# Patient Record
Sex: MALE | Race: WHITE | NOT HISPANIC OR LATINO | Employment: FULL TIME | URBAN - METROPOLITAN AREA
[De-identification: names, ages, dates, MRNs, and addresses within clinical notes are randomized per-mention and may not be internally consistent; named-entity substitution may affect disease eponyms.]

---

## 2018-04-14 LAB
ABSOL LYMPHOCYTES (HISTORICAL): 0.6 K/UL (ref 0.5–4)
ALBUMIN SERPL BCP-MCNC: 3.9 G/DL (ref 3–5.2)
ALP SERPL-CCNC: 46 U/L (ref 43–122)
ALT SERPL W P-5'-P-CCNC: 31 U/L (ref 9–52)
ANION GAP SERPL CALCULATED.3IONS-SCNC: 11 MMOL/L (ref 5–14)
AST SERPL W P-5'-P-CCNC: 22 U/L (ref 17–59)
BASOPHILS # BLD AUTO: 0 K/UL (ref 0–0.1)
BASOPHILS # BLD AUTO: 1 % (ref 0–1)
BILIRUB SERPL-MCNC: 0.5 MG/DL
BUN SERPL-MCNC: 30 MG/DL (ref 5–25)
CALCIUM SERPL-MCNC: 9 MG/DL (ref 8.4–10.2)
CHLORIDE SERPL-SCNC: 111 MEQ/L (ref 97–108)
CHOLEST SERPL-MCNC: 118 MG/DL
CHOLEST/HDLC SERPL: 2.5 {RATIO}
CO2 SERPL-SCNC: 19 MMOL/L (ref 22–30)
CREATINE, SERUM (HISTORICAL): 1.13 MG/DL (ref 0.7–1.5)
DEPRECATED RDW RBC AUTO: 13.6 %
EGFR (HISTORICAL): >60 ML/MIN/1.73 M2
EOSINOPHIL # BLD AUTO: 0.2 K/UL (ref 0–0.4)
EOSINOPHIL NFR BLD AUTO: 3 % (ref 0–6)
GLUCOSE FASTING (HISTORICAL): 107 MG/DL (ref 70–99)
HCT VFR BLD AUTO: 31 % (ref 41–53)
HDLC SERPL-MCNC: 47 MG/DL
HGB BLD-MCNC: 10.2 G/DL (ref 13.5–17.5)
LDL/HDL RATIO (HISTORICAL): 1
LDLC SERPL CALC-MCNC: 47 MG/DL
LYMPHOCYTES NFR BLD AUTO: 11 % (ref 25–45)
MCH RBC QN AUTO: 31.5 PG (ref 26–34)
MCHC RBC AUTO-ENTMCNC: 32.9 % (ref 31–36)
MCV RBC AUTO: 96 FL (ref 80–100)
MONOCYTES # BLD AUTO: 0.6 K/UL (ref 0.2–0.9)
MONOCYTES NFR BLD AUTO: 10 % (ref 1–10)
NEUTROPHILS ABS COUNT (HISTORICAL): 4.3 K/UL (ref 1.8–7.8)
NEUTS SEG NFR BLD AUTO: 75 % (ref 45–65)
PLATELET # BLD AUTO: 157 K/MCL (ref 150–450)
POTASSIUM SERPL-SCNC: 4.1 MEQ/L (ref 3.6–5)
PSA (HISTORICAL): 0.37 NG/ML
RBC # BLD AUTO: 3.24 M/MCL (ref 4.5–5.9)
SODIUM SERPL-SCNC: 141 MEQ/L (ref 137–147)
T4 FREE SERPL-MCNC: 0.86 NG/DL (ref 0.78–2.19)
TOTAL PROTEIN (HISTORICAL): 6.1 G/DL (ref 5.9–8.4)
TRIGL SERPL-MCNC: 118 MG/DL
TSH SERPL DL<=0.05 MIU/L-ACNC: 0.44 UIU/ML (ref 0.47–4.68)
VITAMIN D25-HYDROXY (HISTORICAL): 31.2 NG/ML (ref 30–100)
VLDLC SERPL CALC-MCNC: 24 MG/DL (ref 0–40)
WBC # BLD AUTO: 5.7 K/MCL (ref 4.5–11)

## 2018-04-15 LAB
EST. AVERAGE GLUCOSE BLD GHB EST-MCNC: 123 MG/DL
HBA1C MFR BLD HPLC: 5.9 %

## 2018-06-02 LAB
ABSOL LYMPHOCYTES (HISTORICAL): 0.8 K/UL (ref 0.5–4)
BASOPHILS # BLD AUTO: 0 K/UL (ref 0–0.1)
BASOPHILS # BLD AUTO: 1 % (ref 0–1)
DEPRECATED RDW RBC AUTO: 14.3 %
EOSINOPHIL # BLD AUTO: 0.2 K/UL (ref 0–0.4)
EOSINOPHIL NFR BLD AUTO: 4 % (ref 0–6)
HCT VFR BLD AUTO: 31.9 % (ref 41–53)
HGB BLD-MCNC: 10.7 G/DL (ref 13.5–17.5)
LYMPHOCYTES NFR BLD AUTO: 16 % (ref 25–45)
MCH RBC QN AUTO: 33.1 PG (ref 26–34)
MCHC RBC AUTO-ENTMCNC: 33.7 % (ref 31–36)
MCV RBC AUTO: 98 FL (ref 80–100)
MONOCYTES # BLD AUTO: 0.5 K/UL (ref 0.2–0.9)
MONOCYTES NFR BLD AUTO: 10 % (ref 1–10)
NEUTROPHILS ABS COUNT (HISTORICAL): 3.6 K/UL (ref 1.8–7.8)
NEUTS SEG NFR BLD AUTO: 69 % (ref 45–65)
PLATELET # BLD AUTO: 159 K/MCL (ref 150–450)
RBC # BLD AUTO: 3.25 M/MCL (ref 4.5–5.9)
WBC # BLD AUTO: 5.1 K/MCL (ref 4.5–11)

## 2018-07-14 ENCOUNTER — APPOINTMENT (OUTPATIENT)
Dept: LAB | Facility: IMAGING CENTER | Age: 63
End: 2018-07-14
Payer: COMMERCIAL

## 2018-07-14 ENCOUNTER — TRANSCRIBE ORDERS (OUTPATIENT)
Dept: ADMINISTRATIVE | Facility: HOSPITAL | Age: 63
End: 2018-07-14

## 2018-07-14 DIAGNOSIS — R73.9 HYPERGLYCEMIA: ICD-10-CM

## 2018-07-14 DIAGNOSIS — Z00.00 ROUTINE GENERAL MEDICAL EXAMINATION AT A HEALTH CARE FACILITY: Primary | ICD-10-CM

## 2018-07-14 DIAGNOSIS — E55.9 VITAMIN D DEFICIENCY, UNSPECIFIED: ICD-10-CM

## 2018-07-14 DIAGNOSIS — Z00.00 ROUTINE GENERAL MEDICAL EXAMINATION AT A HEALTH CARE FACILITY: ICD-10-CM

## 2018-07-14 DIAGNOSIS — I10 ESSENTIAL (PRIMARY) HYPERTENSION: ICD-10-CM

## 2018-07-14 LAB
25(OH)D3 SERPL-MCNC: 22.3 NG/ML (ref 30–100)
ALBUMIN SERPL BCP-MCNC: 3.6 G/DL (ref 3.5–5)
ALP SERPL-CCNC: 48 U/L (ref 46–116)
ALT SERPL W P-5'-P-CCNC: 31 U/L (ref 12–78)
ANION GAP SERPL CALCULATED.3IONS-SCNC: 3 MMOL/L (ref 4–13)
AST SERPL W P-5'-P-CCNC: 22 U/L (ref 5–45)
BASOPHILS # BLD AUTO: 0.04 THOUSANDS/ΜL (ref 0–0.1)
BASOPHILS NFR BLD AUTO: 1 % (ref 0–1)
BILIRUB SERPL-MCNC: 0.44 MG/DL (ref 0.2–1)
BUN SERPL-MCNC: 16 MG/DL (ref 5–25)
CALCIUM SERPL-MCNC: 8.5 MG/DL (ref 8.3–10.1)
CHLORIDE SERPL-SCNC: 110 MMOL/L (ref 100–108)
CHOLEST SERPL-MCNC: 126 MG/DL (ref 50–200)
CO2 SERPL-SCNC: 26 MMOL/L (ref 21–32)
CREAT SERPL-MCNC: 1.03 MG/DL (ref 0.6–1.3)
EOSINOPHIL # BLD AUTO: 0.13 THOUSAND/ΜL (ref 0–0.61)
EOSINOPHIL NFR BLD AUTO: 2 % (ref 0–6)
ERYTHROCYTE [DISTWIDTH] IN BLOOD BY AUTOMATED COUNT: 13.2 % (ref 11.6–15.1)
EST. AVERAGE GLUCOSE BLD GHB EST-MCNC: 105 MG/DL
GFR SERPL CREATININE-BSD FRML MDRD: 77 ML/MIN/1.73SQ M
GLUCOSE P FAST SERPL-MCNC: 108 MG/DL (ref 65–99)
HBA1C MFR BLD: 5.3 % (ref 4.2–6.3)
HCT VFR BLD AUTO: 34.5 % (ref 36.5–49.3)
HDLC SERPL-MCNC: 52 MG/DL (ref 40–60)
HGB BLD-MCNC: 10.9 G/DL (ref 12–17)
IMM GRANULOCYTES # BLD AUTO: 0.02 THOUSAND/UL (ref 0–0.2)
IMM GRANULOCYTES NFR BLD AUTO: 0 % (ref 0–2)
LDLC SERPL CALC-MCNC: 57 MG/DL (ref 0–100)
LYMPHOCYTES # BLD AUTO: 0.76 THOUSANDS/ΜL (ref 0.6–4.47)
LYMPHOCYTES NFR BLD AUTO: 14 % (ref 14–44)
MCH RBC QN AUTO: 31.9 PG (ref 26.8–34.3)
MCHC RBC AUTO-ENTMCNC: 31.6 G/DL (ref 31.4–37.4)
MCV RBC AUTO: 101 FL (ref 82–98)
MONOCYTES # BLD AUTO: 0.49 THOUSAND/ΜL (ref 0.17–1.22)
MONOCYTES NFR BLD AUTO: 9 % (ref 4–12)
NEUTROPHILS # BLD AUTO: 3.98 THOUSANDS/ΜL (ref 1.85–7.62)
NEUTS SEG NFR BLD AUTO: 74 % (ref 43–75)
NONHDLC SERPL-MCNC: 74 MG/DL
NRBC BLD AUTO-RTO: 0 /100 WBCS
PLATELET # BLD AUTO: 156 THOUSANDS/UL (ref 149–390)
PMV BLD AUTO: 10 FL (ref 8.9–12.7)
POTASSIUM SERPL-SCNC: 4.1 MMOL/L (ref 3.5–5.3)
PROT SERPL-MCNC: 6.5 G/DL (ref 6.4–8.2)
RBC # BLD AUTO: 3.42 MILLION/UL (ref 3.88–5.62)
SODIUM SERPL-SCNC: 139 MMOL/L (ref 136–145)
TRIGL SERPL-MCNC: 85 MG/DL
TSH SERPL DL<=0.05 MIU/L-ACNC: 0.68 UIU/ML (ref 0.36–3.74)
WBC # BLD AUTO: 5.42 THOUSAND/UL (ref 4.31–10.16)

## 2018-07-14 PROCEDURE — 85025 COMPLETE CBC W/AUTO DIFF WBC: CPT

## 2018-07-14 PROCEDURE — 36415 COLL VENOUS BLD VENIPUNCTURE: CPT

## 2018-07-14 PROCEDURE — 84443 ASSAY THYROID STIM HORMONE: CPT

## 2018-07-14 PROCEDURE — 80061 LIPID PANEL: CPT

## 2018-07-14 PROCEDURE — 80053 COMPREHEN METABOLIC PANEL: CPT

## 2018-07-14 PROCEDURE — 82306 VITAMIN D 25 HYDROXY: CPT

## 2018-07-14 PROCEDURE — 83036 HEMOGLOBIN GLYCOSYLATED A1C: CPT

## 2018-07-29 DIAGNOSIS — K21.9 GASTROESOPHAGEAL REFLUX DISEASE WITHOUT ESOPHAGITIS: Primary | ICD-10-CM

## 2018-07-31 RX ORDER — PANTOPRAZOLE SODIUM 40 MG/1
TABLET, DELAYED RELEASE ORAL
Qty: 90 TABLET | Refills: 1 | Status: SHIPPED | OUTPATIENT
Start: 2018-07-31 | End: 2018-10-09 | Stop reason: SDUPTHER

## 2018-09-24 ENCOUNTER — OFFICE VISIT (OUTPATIENT)
Dept: FAMILY MEDICINE CLINIC | Facility: CLINIC | Age: 63
End: 2018-09-24
Payer: COMMERCIAL

## 2018-09-24 VITALS
RESPIRATION RATE: 16 BRPM | HEART RATE: 76 BPM | TEMPERATURE: 98.6 F | WEIGHT: 210.2 LBS | DIASTOLIC BLOOD PRESSURE: 72 MMHG | OXYGEN SATURATION: 97 % | SYSTOLIC BLOOD PRESSURE: 118 MMHG | BODY MASS INDEX: 31.86 KG/M2 | HEIGHT: 68 IN

## 2018-09-24 DIAGNOSIS — Z23 IMMUNIZATION DUE: ICD-10-CM

## 2018-09-24 DIAGNOSIS — N17.9 ACUTE RENAL FAILURE, UNSPECIFIED ACUTE RENAL FAILURE TYPE (HCC): ICD-10-CM

## 2018-09-24 DIAGNOSIS — I10 ESSENTIAL HYPERTENSION: Primary | ICD-10-CM

## 2018-09-24 DIAGNOSIS — I25.10 CORONARY ARTERY DISEASE INVOLVING NATIVE CORONARY ARTERY OF NATIVE HEART WITHOUT ANGINA PECTORIS: ICD-10-CM

## 2018-09-24 DIAGNOSIS — F32.89 OTHER DEPRESSION: ICD-10-CM

## 2018-09-24 DIAGNOSIS — D50.8 IRON DEFICIENCY ANEMIA SECONDARY TO INADEQUATE DIETARY IRON INTAKE: ICD-10-CM

## 2018-09-24 DIAGNOSIS — E78.49 OTHER HYPERLIPIDEMIA: ICD-10-CM

## 2018-09-24 PROBLEM — D64.9 ANEMIA: Status: ACTIVE | Noted: 2018-04-26

## 2018-09-24 PROCEDURE — 3078F DIAST BP <80 MM HG: CPT | Performed by: FAMILY MEDICINE

## 2018-09-24 PROCEDURE — 90682 RIV4 VACC RECOMBINANT DNA IM: CPT

## 2018-09-24 PROCEDURE — 1036F TOBACCO NON-USER: CPT | Performed by: FAMILY MEDICINE

## 2018-09-24 PROCEDURE — 90471 IMMUNIZATION ADMIN: CPT

## 2018-09-24 PROCEDURE — 99214 OFFICE O/P EST MOD 30 MIN: CPT | Performed by: FAMILY MEDICINE

## 2018-09-24 PROCEDURE — 3074F SYST BP LT 130 MM HG: CPT | Performed by: FAMILY MEDICINE

## 2018-09-24 PROCEDURE — 3008F BODY MASS INDEX DOCD: CPT | Performed by: FAMILY MEDICINE

## 2018-09-24 RX ORDER — LISINOPRIL 10 MG/1
TABLET ORAL
COMMUNITY
Start: 2018-07-24

## 2018-09-24 RX ORDER — RIBOFLAVIN (VITAMIN B2) 100 MG
TABLET ORAL
COMMUNITY
Start: 2017-01-06 | End: 2018-09-24 | Stop reason: ALTCHOICE

## 2018-09-24 RX ORDER — NITROGLYCERIN 0.4 MG/1
0.4 TABLET SUBLINGUAL
COMMUNITY
Start: 2018-04-26

## 2018-09-24 RX ORDER — FERROUS SULFATE 325(65) MG
325 TABLET ORAL
COMMUNITY

## 2018-09-24 RX ORDER — ATORVASTATIN CALCIUM 80 MG/1
TABLET, FILM COATED ORAL EVERY 24 HOURS
COMMUNITY
Start: 2016-04-15

## 2018-09-24 RX ORDER — UBIDECARENONE 100 MG
100 CAPSULE ORAL
COMMUNITY
End: 2019-01-30

## 2018-09-24 RX ORDER — CLOPIDOGREL BISULFATE 75 MG/1
75 TABLET ORAL
COMMUNITY
Start: 2018-04-26

## 2018-09-24 RX ORDER — OMEPRAZOLE 20 MG/1
CAPSULE, DELAYED RELEASE ORAL
COMMUNITY
Start: 2016-06-05 | End: 2018-09-24 | Stop reason: ALTCHOICE

## 2018-09-24 RX ORDER — GABAPENTIN 300 MG/1
600 CAPSULE ORAL
COMMUNITY

## 2018-09-24 RX ORDER — FUROSEMIDE 40 MG/1
TABLET ORAL
COMMUNITY
Start: 2018-07-29

## 2018-09-24 RX ORDER — CARVEDILOL 25 MG/1
TABLET ORAL
COMMUNITY
Start: 2018-07-29

## 2018-09-24 RX ORDER — SPIRONOLACTONE 25 MG/1
25 TABLET ORAL
COMMUNITY
Start: 2018-04-26

## 2018-09-24 RX ORDER — CLOPIDOGREL BISULFATE 75 MG/1
TABLET ORAL EVERY 24 HOURS
COMMUNITY
Start: 2013-12-30 | End: 2018-09-24 | Stop reason: SDUPTHER

## 2018-09-24 NOTE — PROGRESS NOTES
Assessment/Plan:     Diagnoses and all orders for this visit:    Essential hypertension  Comments:  Stable, continue same  Orders:  -     Basic metabolic panel; Future    Other depression  Comments:  Stable, continue same  Iron deficiency anemia secondary to inadequate dietary iron intake  Comments:  Stable, I will continue to monitor  Orders:  -     CBC and differential; Future  -     Iron Saturation %; Future    Coronary artery disease involving native coronary artery of native heart without angina pectoris  Comments:  Stable, continue same  Other hyperlipidemia  Comments:  Stable, continue same  Immunization due  -     influenza vaccine, 2196-0749, quadrivalent, recombinant, PF, 0 5 mL, for patients 18 yr+ (FLUBLOK)    Acute renal failure, unspecified acute renal failure type ([facility] [address] )  Comments:  He had a blood work done through his work and his GFR came back low at 48  Recommend to encourage oral hydration  I am going to repeat BMP  Other orders  -     BUPROPION HCL ER, XL, PO; Take 200 mg by mouth    -     carvedilol (COREG) 25 mg tablet;   -     atorvastatin (LIPITOR) 80 mg tablet; every 24 hours  -     aspirin 81 MG tablet; every 24 hours  -     Discontinue: ascorbic acid (VITAMIN C) 1000 MG TBCR; Take 1,000 mg by mouth  -     clopidogrel (PLAVIX) 75 mg tablet; Take 75 mg by mouth  -     co-enzyme Q-10 100 mg capsule; Take 100 mg by mouth  -     ferrous sulfate 325 (65 Fe) mg tablet; Take 325 mg by mouth  -     Omega-3 Fatty Acids (FISH OIL PO); Take 2 g by mouth  -     furosemide (LASIX) 40 mg tablet;   -     gabapentin (NEURONTIN) 300 mg capsule; Take 600 mg by mouth    -     lisinopril (ZESTRIL) 10 mg tablet;   -     Multiple Vitamins-Minerals (MULTIVITAMIN ADULT PO);  Take 1 tablet by mouth  -     nitroglycerin (NITROSTAT) 0 4 mg SL tablet; Place 0 4 mg under the tongue  -     Discontinue: omeprazole (PriLOSEC) 20 mg delayed release capsule; TAKE ONE CAPSULE BY MOUTH ONCE DAILY  - Discontinue: clopidogrel (PLAVIX) 75 mg tablet; every 24 hours  -     spironolactone (ALDACTONE) 25 mg tablet; Take 25 mg by mouth  -     Discontinue: TURMERIC PO; Take 1,000 mg by mouth  -     Discontinue: Ascorbic Acid (VITAMIN C) 100 MG tablet; take 1 Tablet by Oral route once          There are no Patient Instructions on file for this visit  Return in about 3 months (around 12/24/2018)  Subjective:      Patient ID: Adeline Walsh is a 61 y o  male  Chief Complaint   Patient presents with    Follow-up     lab results       Patient is here today for follow-up multiple medical problems  He has been taking all his medications  He stopped taking ferrous sulfate  He denies any side effects from his medications  He lost his wife recently in August with congestive heart failure  He has been seen psychiatrist was started him on Wellbutrin  He stated that he is doing better since then  He denies feeling depressed  The following portions of the patient's history were reviewed and updated as appropriate: allergies, current medications, past family history, past medical history, past social history, past surgical history and problem list     Review of Systems   Constitutional: Negative for chills and fever  HENT: Negative for trouble swallowing  Eyes: Negative for visual disturbance  Respiratory: Negative for cough and shortness of breath  Cardiovascular: Negative for chest pain and palpitations  Gastrointestinal: Negative for abdominal pain, blood in stool and vomiting  Endocrine: Negative for cold intolerance and heat intolerance  Genitourinary: Negative for difficulty urinating and dysuria  Musculoskeletal: Negative for gait problem  Skin: Negative for rash  Neurological: Negative for dizziness, syncope and headaches  Hematological: Negative for adenopathy  Psychiatric/Behavioral: Negative for behavioral problems           Current Outpatient Prescriptions   Medication Sig Dispense Refill    aspirin 81 MG tablet every 24 hours      atorvastatin (LIPITOR) 80 mg tablet every 24 hours      BUPROPION HCL ER, XL, PO Take 200 mg by mouth        carvedilol (COREG) 25 mg tablet       clopidogrel (PLAVIX) 75 mg tablet Take 75 mg by mouth      co-enzyme Q-10 100 mg capsule Take 100 mg by mouth      furosemide (LASIX) 40 mg tablet       gabapentin (NEURONTIN) 300 mg capsule Take 600 mg by mouth        lisinopril (ZESTRIL) 10 mg tablet       Multiple Vitamins-Minerals (MULTIVITAMIN ADULT PO) Take 1 tablet by mouth      nitroglycerin (NITROSTAT) 0 4 mg SL tablet Place 0 4 mg under the tongue      pantoprazole (PROTONIX) 40 mg tablet TAKE 1 TABLET BY MOUTH ONCE DAILY 90 tablet 1    spironolactone (ALDACTONE) 25 mg tablet Take 25 mg by mouth      ferrous sulfate 325 (65 Fe) mg tablet Take 325 mg by mouth      Omega-3 Fatty Acids (FISH OIL PO) Take 2 g by mouth       No current facility-administered medications for this visit  Objective:    /72 (BP Location: Left arm, Patient Position: Sitting, Cuff Size: Adult)   Pulse 76   Temp 98 6 °F (37 °C) (Tympanic)   Resp 16   Ht 5' 8" (1 727 m)   Wt 95 3 kg (210 lb 3 2 oz)   SpO2 97%   BMI 31 96 kg/m²        Physical Exam   Constitutional: He is oriented to person, place, and time  He appears well-developed and well-nourished  HENT:   Head: Normocephalic and atraumatic  Eyes: EOM are normal  Pupils are equal, round, and reactive to light  Neck: Normal range of motion  Neck supple  Cardiovascular: Normal rate, regular rhythm and normal heart sounds  Pulmonary/Chest: Effort normal and breath sounds normal    Abdominal: Soft  Bowel sounds are normal    Musculoskeletal: Normal range of motion  He exhibits no edema  Lymphadenopathy:     He has no cervical adenopathy  Neurological: He is alert and oriented to person, place, and time  No cranial nerve deficit  Skin: Skin is warm  No rash noted     Psychiatric: He has a normal mood and affect  Nursing note and vitals reviewed               Lakshmi Rodriguez MD

## 2018-10-09 DIAGNOSIS — K21.9 GASTROESOPHAGEAL REFLUX DISEASE WITHOUT ESOPHAGITIS: ICD-10-CM

## 2018-10-09 RX ORDER — PANTOPRAZOLE SODIUM 40 MG/1
40 TABLET, DELAYED RELEASE ORAL DAILY
Qty: 90 TABLET | Refills: 0 | Status: SHIPPED | OUTPATIENT
Start: 2018-10-09 | End: 2018-12-27 | Stop reason: SDUPTHER

## 2018-12-27 ENCOUNTER — OFFICE VISIT (OUTPATIENT)
Dept: FAMILY MEDICINE CLINIC | Facility: CLINIC | Age: 63
End: 2018-12-27
Payer: COMMERCIAL

## 2018-12-27 VITALS
HEART RATE: 71 BPM | OXYGEN SATURATION: 97 % | WEIGHT: 209.2 LBS | BODY MASS INDEX: 31.71 KG/M2 | RESPIRATION RATE: 16 BRPM | HEIGHT: 68 IN | TEMPERATURE: 98.6 F | DIASTOLIC BLOOD PRESSURE: 80 MMHG | SYSTOLIC BLOOD PRESSURE: 150 MMHG

## 2018-12-27 DIAGNOSIS — E55.9 VITAMIN D INSUFFICIENCY: ICD-10-CM

## 2018-12-27 DIAGNOSIS — Z11.59 ENCOUNTER FOR HEPATITIS C SCREENING TEST FOR LOW RISK PATIENT: ICD-10-CM

## 2018-12-27 DIAGNOSIS — Z12.5 PROSTATE CANCER SCREENING: ICD-10-CM

## 2018-12-27 DIAGNOSIS — I10 ESSENTIAL HYPERTENSION: ICD-10-CM

## 2018-12-27 DIAGNOSIS — K21.9 GASTROESOPHAGEAL REFLUX DISEASE WITHOUT ESOPHAGITIS: Primary | ICD-10-CM

## 2018-12-27 DIAGNOSIS — I25.10 CORONARY ARTERY DISEASE INVOLVING NATIVE CORONARY ARTERY OF NATIVE HEART WITHOUT ANGINA PECTORIS: ICD-10-CM

## 2018-12-27 DIAGNOSIS — G47.33 OSA (OBSTRUCTIVE SLEEP APNEA): ICD-10-CM

## 2018-12-27 DIAGNOSIS — E78.49 OTHER HYPERLIPIDEMIA: ICD-10-CM

## 2018-12-27 PROCEDURE — 1036F TOBACCO NON-USER: CPT | Performed by: FAMILY MEDICINE

## 2018-12-27 PROCEDURE — 99214 OFFICE O/P EST MOD 30 MIN: CPT | Performed by: FAMILY MEDICINE

## 2018-12-27 RX ORDER — PANTOPRAZOLE SODIUM 40 MG/1
40 TABLET, DELAYED RELEASE ORAL DAILY
Qty: 90 TABLET | Refills: 0 | Status: SHIPPED | OUTPATIENT
Start: 2018-12-27 | End: 2019-04-08 | Stop reason: SDUPTHER

## 2018-12-27 NOTE — PROGRESS NOTES
Assessment/Plan:     Diagnoses and all orders for this visit:    Gastroesophageal reflux disease without esophagitis  Comments:  Stable  Continue same  Will continue to monitor  Orders:  -     pantoprazole (PROTONIX) 40 mg tablet; Take 1 tablet (40 mg total) by mouth daily    Essential hypertension  Comments:  Not well controlled  Continue same  He is to monitor his blood pressure and follow up with cardiologist   Orders:  -     CBC and differential; Future  -     Comprehensive metabolic panel; Future  -     TSH, 3rd generation with Free T4 reflex; Future    Vitamin D insufficiency  -     Vitamin D 25 hydroxy; Future    Encounter for hepatitis C screening test for low risk patient  -     Hepatitis C antibody; Future    Prostate cancer screening  -     PSA, Total Screen; Future    Other hyperlipidemia  Comments:  Stable  Continue same  We will continue to monitor  He was given order for blood work  Orders:  -     Lipid Panel with Direct LDL reflex; Future    SHARA (obstructive sleep apnea)  Comments:  Stable  Continue to use CPAP machine  Coronary artery disease involving native coronary artery of native heart without angina pectoris  Comments:  Stable continue same  Continue to follow up with cardiologist           There are no Patient Instructions on file for this visit  Return in about 6 months (around 6/27/2019)  Subjective:      Patient ID: Su Harris is a 61 y o  male  Chief Complaint   Patient presents with    Follow-up     htn       He is here today for follow-up multiple medical problems  He has been taking all his medications  He denies any side effects from his medications  He follows up with cardiologist for his heart disease and his hypertension          The following portions of the patient's history were reviewed and updated as appropriate: allergies, current medications, past family history, past medical history, past social history, past surgical history and problem list     Review of Systems   Constitutional: Negative for chills and fever  HENT: Negative for trouble swallowing  Eyes: Negative for visual disturbance  Respiratory: Negative for cough and shortness of breath  Cardiovascular: Negative for chest pain and palpitations  Gastrointestinal: Negative for abdominal pain, blood in stool and vomiting  Endocrine: Negative for cold intolerance and heat intolerance  Genitourinary: Negative for difficulty urinating and dysuria  Musculoskeletal: Negative for gait problem  Skin: Negative for rash  Neurological: Negative for dizziness, syncope and headaches  Hematological: Negative for adenopathy  Psychiatric/Behavioral: Negative for behavioral problems  Current Outpatient Prescriptions   Medication Sig Dispense Refill    aspirin 81 MG tablet every 24 hours      atorvastatin (LIPITOR) 80 mg tablet every 24 hours      BUPROPION HCL ER, XL, PO Take 200 mg by mouth        carvedilol (COREG) 25 mg tablet       clopidogrel (PLAVIX) 75 mg tablet Take 75 mg by mouth      ferrous sulfate 325 (65 Fe) mg tablet Take 325 mg by mouth      furosemide (LASIX) 40 mg tablet       gabapentin (NEURONTIN) 300 mg capsule Take 600 mg by mouth        lisinopril (ZESTRIL) 10 mg tablet       Multiple Vitamins-Minerals (MULTIVITAMIN ADULT PO) Take 1 tablet by mouth      nitroglycerin (NITROSTAT) 0 4 mg SL tablet Place 0 4 mg under the tongue      Omega-3 Fatty Acids (FISH OIL PO) Take 2 g by mouth      pantoprazole (PROTONIX) 40 mg tablet Take 1 tablet (40 mg total) by mouth daily 90 tablet 0    spironolactone (ALDACTONE) 25 mg tablet Take 25 mg by mouth      co-enzyme Q-10 100 mg capsule Take 100 mg by mouth       No current facility-administered medications for this visit          Objective:    /80 (BP Location: Left arm, Patient Position: Sitting, Cuff Size: Adult)   Pulse 71   Temp 98 6 °F (37 °C) (Tympanic)   Resp 16   Ht 5' 8" (1 727 m)   Wt 94 9 kg (209 lb 3 2 oz)   SpO2 97%   BMI 31 81 kg/m²        Physical Exam   Constitutional: He is oriented to person, place, and time  He appears well-developed and well-nourished  HENT:   Head: Normocephalic and atraumatic  Eyes: Pupils are equal, round, and reactive to light  EOM are normal    Neck: Normal range of motion  Neck supple  Cardiovascular: Normal rate, regular rhythm and normal heart sounds  Pulmonary/Chest: Effort normal and breath sounds normal    Abdominal: Soft  Bowel sounds are normal    Musculoskeletal: Normal range of motion  He exhibits no edema  Lymphadenopathy:     He has no cervical adenopathy  Neurological: He is alert and oriented to person, place, and time  No cranial nerve deficit  Skin: Skin is warm  No rash noted  Psychiatric: He has a normal mood and affect  Nursing note and vitals reviewed               Melinda Sargent MD

## 2019-01-30 ENCOUNTER — OFFICE VISIT (OUTPATIENT)
Dept: FAMILY MEDICINE CLINIC | Facility: CLINIC | Age: 64
End: 2019-01-30
Payer: COMMERCIAL

## 2019-01-30 ENCOUNTER — HOSPITAL ENCOUNTER (OUTPATIENT)
Dept: RADIOLOGY | Facility: IMAGING CENTER | Age: 64
Discharge: HOME | End: 2019-01-30
Payer: COMMERCIAL

## 2019-01-30 ENCOUNTER — TRANSCRIBE ORDERS (OUTPATIENT)
Dept: ADMINISTRATIVE | Facility: HOSPITAL | Age: 64
End: 2019-01-30

## 2019-01-30 VITALS
SYSTOLIC BLOOD PRESSURE: 136 MMHG | BODY MASS INDEX: 32.13 KG/M2 | DIASTOLIC BLOOD PRESSURE: 76 MMHG | TEMPERATURE: 99.4 F | HEART RATE: 86 BPM | HEIGHT: 68 IN | OXYGEN SATURATION: 98 % | RESPIRATION RATE: 16 BRPM | WEIGHT: 212 LBS

## 2019-01-30 DIAGNOSIS — M10.9 ACUTE GOUT INVOLVING TOE OF LEFT FOOT, UNSPECIFIED CAUSE: ICD-10-CM

## 2019-01-30 DIAGNOSIS — M10.9 ACUTE GOUT INVOLVING TOE OF LEFT FOOT, UNSPECIFIED CAUSE: Primary | ICD-10-CM

## 2019-01-30 PROCEDURE — 1036F TOBACCO NON-USER: CPT | Performed by: NURSE PRACTITIONER

## 2019-01-30 PROCEDURE — 99213 OFFICE O/P EST LOW 20 MIN: CPT | Performed by: NURSE PRACTITIONER

## 2019-01-30 PROCEDURE — 73660 X-RAY EXAM OF TOE(S): CPT

## 2019-01-30 PROCEDURE — 3008F BODY MASS INDEX DOCD: CPT | Performed by: NURSE PRACTITIONER

## 2019-01-30 RX ORDER — COLCHICINE 0.6 MG/1
TABLET ORAL
Qty: 3 TABLET | Refills: 0 | Status: SHIPPED | OUTPATIENT
Start: 2019-01-30 | End: 2019-02-11 | Stop reason: SDUPTHER

## 2019-01-30 RX ORDER — METHYLPREDNISOLONE 4 MG/1
TABLET ORAL
Qty: 21 EACH | Refills: 0 | Status: SHIPPED | OUTPATIENT
Start: 2019-01-30 | End: 2019-02-11 | Stop reason: SDUPTHER

## 2019-01-30 NOTE — PROGRESS NOTES
Assessment/Plan:      Diagnoses and all orders for this visit:    Acute gout involving toe of left foot, unspecified cause  Comments: Will x-ray the toe, start on colchicine and medrol dose pack  To return is not improved  Discussed foods that can cause Gout  Orders:  -     XR toe left great min 2 views; Future  -     colchicine (COLCRYS) 0 6 mg tablet; Take two pill now and repeat one pill in a hour   -     methylPREDNISolone 4 MG tablet therapy pack; Use as directed on package          Subjective:     Patient ID: Mirta Alexander is a 59 y o  male here for left toe pain    HPI  Started with pain in the left great toe one week ago  The toe is red, edematous and painful  Review of Systems   Constitutional: Positive for activity change  Respiratory: Negative  Cardiovascular: Negative  Gastrointestinal: Positive for diarrhea  Genitourinary: Negative  Musculoskeletal: Positive for arthralgias, gait problem and joint swelling  Skin: Negative  Allergic/Immunologic: Negative  Psychiatric/Behavioral: Negative  Objective:     Physical Exam   Constitutional: He is oriented to person, place, and time  He appears well-developed and well-nourished  HENT:   Head: Normocephalic  Eyes: Conjunctivae and EOM are normal    Neck: Normal range of motion  Neck supple  Cardiovascular: Normal rate and normal heart sounds  Pulmonary/Chest: Effort normal and breath sounds normal    Musculoskeletal: He exhibits edema and tenderness (in the left great toe  Toe is erythremic )  Neurological: He is alert and oriented to person, place, and time  Skin: Skin is warm and dry  There is erythema  Psychiatric: He has a normal mood and affect   His behavior is normal

## 2019-02-11 DIAGNOSIS — M10.9 ACUTE GOUT INVOLVING TOE OF LEFT FOOT, UNSPECIFIED CAUSE: ICD-10-CM

## 2019-02-11 NOTE — TELEPHONE ENCOUNTER
Pc from pts daughter pt was seen last wk  For gouttook all of his medication  He is finished with the medication daughter states it came back  Beronica Catalan  Please advise  Tg Kaplan ph #[phone]

## 2019-02-12 RX ORDER — METHYLPREDNISOLONE 4 MG/1
TABLET ORAL
Qty: 21 EACH | Refills: 0 | Status: SHIPPED | OUTPATIENT
Start: 2019-02-12 | End: 2019-06-28 | Stop reason: ALTCHOICE

## 2019-02-12 RX ORDER — COLCHICINE 0.6 MG/1
TABLET ORAL
Qty: 3 TABLET | Refills: 0 | Status: SHIPPED | OUTPATIENT
Start: 2019-02-12 | End: 2019-08-20 | Stop reason: SDUPTHER

## 2019-04-08 ENCOUNTER — TELEPHONE (OUTPATIENT)
Dept: FAMILY MEDICINE CLINIC | Facility: CLINIC | Age: 64
End: 2019-04-08

## 2019-04-08 DIAGNOSIS — K21.9 GASTROESOPHAGEAL REFLUX DISEASE WITHOUT ESOPHAGITIS: ICD-10-CM

## 2019-04-08 RX ORDER — PANTOPRAZOLE SODIUM 40 MG/1
40 TABLET, DELAYED RELEASE ORAL DAILY
Qty: 90 TABLET | Refills: 0 | Status: SHIPPED | OUTPATIENT
Start: 2019-04-08 | End: 2019-09-04 | Stop reason: SDUPTHER

## 2019-04-20 ENCOUNTER — TRANSCRIBE ORDERS (OUTPATIENT)
Dept: ADMINISTRATIVE | Facility: HOSPITAL | Age: 64
End: 2019-04-20

## 2019-04-20 ENCOUNTER — APPOINTMENT (OUTPATIENT)
Dept: LAB | Facility: IMAGING CENTER | Age: 64
End: 2019-04-20
Payer: COMMERCIAL

## 2019-04-20 DIAGNOSIS — Z11.59 ENCOUNTER FOR HEPATITIS C SCREENING TEST FOR LOW RISK PATIENT: ICD-10-CM

## 2019-04-20 DIAGNOSIS — I10 ESSENTIAL HYPERTENSION: ICD-10-CM

## 2019-04-20 DIAGNOSIS — Z12.5 PROSTATE CANCER SCREENING: ICD-10-CM

## 2019-04-20 DIAGNOSIS — E78.49 OTHER HYPERLIPIDEMIA: ICD-10-CM

## 2019-04-20 DIAGNOSIS — E55.9 VITAMIN D INSUFFICIENCY: ICD-10-CM

## 2019-04-20 DIAGNOSIS — I10 ESSENTIAL HYPERTENSION: Primary | ICD-10-CM

## 2019-04-20 LAB
ALBUMIN SERPL BCP-MCNC: 3.6 G/DL (ref 3.5–5)
ALP SERPL-CCNC: 56 U/L (ref 46–116)
ALT SERPL W P-5'-P-CCNC: 27 U/L (ref 12–78)
ANION GAP SERPL CALCULATED.3IONS-SCNC: 5 MMOL/L (ref 4–13)
AST SERPL W P-5'-P-CCNC: 20 U/L (ref 5–45)
BILIRUB SERPL-MCNC: 0.77 MG/DL (ref 0.2–1)
BUN SERPL-MCNC: 27 MG/DL (ref 5–25)
CALCIUM SERPL-MCNC: 8.7 MG/DL (ref 8.3–10.1)
CHLORIDE SERPL-SCNC: 107 MMOL/L (ref 100–108)
CHOLEST SERPL-MCNC: 139 MG/DL (ref 50–200)
CO2 SERPL-SCNC: 28 MMOL/L (ref 21–32)
CREAT SERPL-MCNC: 1.19 MG/DL (ref 0.6–1.3)
ERYTHROCYTE [DISTWIDTH] IN BLOOD BY AUTOMATED COUNT: 13.6 % (ref 11.6–15.1)
GFR SERPL CREATININE-BSD FRML MDRD: 64 ML/MIN/1.73SQ M
GLUCOSE P FAST SERPL-MCNC: 112 MG/DL (ref 65–99)
HCT VFR BLD AUTO: 33.2 % (ref 36.5–49.3)
HDLC SERPL-MCNC: 66 MG/DL (ref 40–60)
HGB BLD-MCNC: 10.6 G/DL (ref 12–17)
LDLC SERPL CALC-MCNC: 46 MG/DL (ref 0–100)
MCH RBC QN AUTO: 32.5 PG (ref 26.8–34.3)
MCHC RBC AUTO-ENTMCNC: 31.9 G/DL (ref 31.4–37.4)
MCV RBC AUTO: 102 FL (ref 82–98)
NONHDLC SERPL-MCNC: 73 MG/DL
PLATELET # BLD AUTO: 168 THOUSANDS/UL (ref 149–390)
PMV BLD AUTO: 9.6 FL (ref 8.9–12.7)
POTASSIUM SERPL-SCNC: 4 MMOL/L (ref 3.5–5.3)
PROT SERPL-MCNC: 6.6 G/DL (ref 6.4–8.2)
RBC # BLD AUTO: 3.26 MILLION/UL (ref 3.88–5.62)
SODIUM SERPL-SCNC: 140 MMOL/L (ref 136–145)
TRIGL SERPL-MCNC: 133 MG/DL
WBC # BLD AUTO: 6.03 THOUSAND/UL (ref 4.31–10.16)

## 2019-04-20 PROCEDURE — 36415 COLL VENOUS BLD VENIPUNCTURE: CPT

## 2019-04-20 PROCEDURE — 80061 LIPID PANEL: CPT

## 2019-04-20 PROCEDURE — 80053 COMPREHEN METABOLIC PANEL: CPT

## 2019-04-20 PROCEDURE — 85027 COMPLETE CBC AUTOMATED: CPT

## 2019-06-28 ENCOUNTER — OFFICE VISIT (OUTPATIENT)
Dept: FAMILY MEDICINE CLINIC | Facility: CLINIC | Age: 64
End: 2019-06-28
Payer: COMMERCIAL

## 2019-06-28 VITALS
WEIGHT: 200.13 LBS | SYSTOLIC BLOOD PRESSURE: 110 MMHG | HEIGHT: 68 IN | DIASTOLIC BLOOD PRESSURE: 54 MMHG | TEMPERATURE: 99.2 F | BODY MASS INDEX: 30.33 KG/M2 | OXYGEN SATURATION: 97 % | HEART RATE: 76 BPM | RESPIRATION RATE: 16 BRPM

## 2019-06-28 DIAGNOSIS — I10 ESSENTIAL HYPERTENSION: ICD-10-CM

## 2019-06-28 DIAGNOSIS — Z12.5 PROSTATE CANCER SCREENING: ICD-10-CM

## 2019-06-28 DIAGNOSIS — E78.49 OTHER HYPERLIPIDEMIA: ICD-10-CM

## 2019-06-28 DIAGNOSIS — M1A.00X0 IDIOPATHIC CHRONIC GOUT WITHOUT TOPHUS, UNSPECIFIED SITE: ICD-10-CM

## 2019-06-28 DIAGNOSIS — Z12.11 ENCOUNTER FOR SCREENING FOR MALIGNANT NEOPLASM OF COLON: ICD-10-CM

## 2019-06-28 DIAGNOSIS — Z23 ENCOUNTER FOR IMMUNIZATION: ICD-10-CM

## 2019-06-28 DIAGNOSIS — I25.5 ISCHEMIC CARDIOMYOPATHY: ICD-10-CM

## 2019-06-28 DIAGNOSIS — K21.9 GASTROESOPHAGEAL REFLUX DISEASE WITHOUT ESOPHAGITIS: Primary | ICD-10-CM

## 2019-06-28 PROCEDURE — 3008F BODY MASS INDEX DOCD: CPT | Performed by: FAMILY MEDICINE

## 2019-06-28 PROCEDURE — 3078F DIAST BP <80 MM HG: CPT | Performed by: FAMILY MEDICINE

## 2019-06-28 PROCEDURE — 99214 OFFICE O/P EST MOD 30 MIN: CPT | Performed by: FAMILY MEDICINE

## 2019-06-28 PROCEDURE — 1036F TOBACCO NON-USER: CPT | Performed by: FAMILY MEDICINE

## 2019-06-28 PROCEDURE — 3074F SYST BP LT 130 MM HG: CPT | Performed by: FAMILY MEDICINE

## 2019-06-28 RX ORDER — BUPROPION HYDROCHLORIDE 300 MG/1
300 TABLET ORAL EVERY MORNING
Refills: 1 | COMMUNITY
Start: 2019-06-09

## 2019-08-20 ENCOUNTER — OFFICE VISIT (OUTPATIENT)
Dept: FAMILY MEDICINE CLINIC | Facility: CLINIC | Age: 64
End: 2019-08-20
Payer: COMMERCIAL

## 2019-08-20 VITALS
DIASTOLIC BLOOD PRESSURE: 82 MMHG | HEART RATE: 82 BPM | TEMPERATURE: 99.6 F | OXYGEN SATURATION: 98 % | HEIGHT: 68 IN | BODY MASS INDEX: 31.25 KG/M2 | WEIGHT: 206.2 LBS | SYSTOLIC BLOOD PRESSURE: 128 MMHG | RESPIRATION RATE: 16 BRPM

## 2019-08-20 DIAGNOSIS — M10.479 ACUTE GOUT DUE TO OTHER SECONDARY CAUSE INVOLVING TOE, UNSPECIFIED LATERALITY: ICD-10-CM

## 2019-08-20 PROCEDURE — 3008F BODY MASS INDEX DOCD: CPT | Performed by: FAMILY MEDICINE

## 2019-08-20 PROCEDURE — 99214 OFFICE O/P EST MOD 30 MIN: CPT | Performed by: FAMILY MEDICINE

## 2019-08-20 PROCEDURE — 1036F TOBACCO NON-USER: CPT | Performed by: FAMILY MEDICINE

## 2019-08-20 RX ORDER — CITALOPRAM 20 MG/1
20 TABLET ORAL EVERY MORNING
Refills: 0 | COMMUNITY
Start: 2019-08-15

## 2019-08-20 RX ORDER — PREDNISONE 50 MG/1
50 TABLET ORAL DAILY
Qty: 5 TABLET | Refills: 0 | Status: SHIPPED | OUTPATIENT
Start: 2019-08-20 | End: 2019-08-25

## 2019-08-20 RX ORDER — COLCHICINE 0.6 MG/1
TABLET ORAL
Qty: 20 TABLET | Refills: 0 | Status: SHIPPED | OUTPATIENT
Start: 2019-08-20

## 2019-08-20 NOTE — PROGRESS NOTES
Assessment/Plan:  Acute gout involving toe of left foot  He was told to get his blood work done and came back in 3 weeks  Diagnoses and all orders for this visit:    Acute gout due to other secondary cause involving toe, unspecified laterality  Comments:  He was given prescription for prednisone 50 mg daily starting tomorrow  He was given prescription for colchicine to take 2 tablets now and then 1 tablet in 1 h  Orders:  -     predniSONE 50 mg tablet; Take 1 tablet (50 mg total) by mouth daily for 5 days  -     colchicine (COLCRYS) 0 6 mg tablet; Take two pill now and repeat one pill in a hour  Other orders  -     citalopram (CeleXA) 20 mg tablet; Take 20 mg by mouth every morning          There are no Patient Instructions on file for this visit  Return in about 3 weeks (around 9/10/2019)  Subjective:      Patient ID: Allegra Dewey is a 59 y o  male  Chief Complaint   Patient presents with    Gout     both feet       He is here today with complaint of pain in his feet bilateral start yesterday  He stated he had history of gout couple months ago and his symptoms improved  He did not get the blood work done for his uric acid yet  He denies any recent history of injury or trauma  He denies any fever or chills  The following portions of the patient's history were reviewed and updated as appropriate: allergies, current medications, past family history, past medical history, past social history, past surgical history and problem list     Review of Systems   Constitutional: Negative for chills and fever  HENT: Negative for trouble swallowing  Eyes: Negative for visual disturbance  Respiratory: Negative for cough and shortness of breath  Cardiovascular: Negative for chest pain and palpitations  Gastrointestinal: Negative for abdominal pain, blood in stool and vomiting  Endocrine: Negative for cold intolerance and heat intolerance     Genitourinary: Negative for difficulty urinating and dysuria  Musculoskeletal:        Foot pain bilateral   Skin: Negative for rash  Neurological: Negative for dizziness, syncope and headaches  Hematological: Negative for adenopathy  Psychiatric/Behavioral: Negative for behavioral problems  Current Outpatient Medications   Medication Sig Dispense Refill    aspirin 81 MG tablet every 24 hours      atorvastatin (LIPITOR) 80 mg tablet every 24 hours      buPROPion (WELLBUTRIN XL) 300 mg 24 hr tablet Take 300 mg by mouth every morning  1    carvedilol (COREG) 25 mg tablet       citalopram (CeleXA) 20 mg tablet Take 20 mg by mouth every morning  0    clopidogrel (PLAVIX) 75 mg tablet Take 75 mg by mouth      furosemide (LASIX) 40 mg tablet       lisinopril (ZESTRIL) 10 mg tablet       Multiple Vitamins-Minerals (MULTIVITAMIN ADULT PO) Take 1 tablet by mouth      nitroglycerin (NITROSTAT) 0 4 mg SL tablet Place 0 4 mg under the tongue      pantoprazole (PROTONIX) 40 mg tablet Take 1 tablet (40 mg total) by mouth daily 90 tablet 0    spironolactone (ALDACTONE) 25 mg tablet Take 25 mg by mouth      colchicine (COLCRYS) 0 6 mg tablet Take two pill now and repeat one pill in a hour  20 tablet 0    ferrous sulfate 325 (65 Fe) mg tablet Take 325 mg by mouth      gabapentin (NEURONTIN) 300 mg capsule Take 600 mg by mouth        predniSONE 50 mg tablet Take 1 tablet (50 mg total) by mouth daily for 5 days 5 tablet 0     No current facility-administered medications for this visit  Objective:    /82 (BP Location: Left arm, Patient Position: Sitting, Cuff Size: Adult)   Pulse 82   Temp 99 6 °F (37 6 °C) (Tympanic)   Resp 16   Ht 5' 8" (1 727 m)   Wt 93 5 kg (206 lb 3 2 oz)   SpO2 98%   BMI 31 35 kg/m²        Physical Exam   Constitutional: He is oriented to person, place, and time  He appears well-developed and well-nourished  HENT:   Head: Normocephalic and atraumatic     Eyes: Pupils are equal, round, and reactive to light  EOM are normal    Neck: Normal range of motion  Neck supple  Cardiovascular: Normal rate, regular rhythm and normal heart sounds  Pulmonary/Chest: Effort normal and breath sounds normal    Abdominal: Soft  Bowel sounds are normal    Musculoskeletal: Normal range of motion  He exhibits no edema  Feet:    Lymphadenopathy:     He has no cervical adenopathy  Neurological: He is alert and oriented to person, place, and time  No cranial nerve deficit  Skin: Skin is warm  No rash noted  Psychiatric: He has a normal mood and affect  Nursing note and vitals reviewed               Leanne Penny MD

## 2019-09-04 ENCOUNTER — TELEPHONE (OUTPATIENT)
Dept: FAMILY MEDICINE CLINIC | Facility: CLINIC | Age: 64
End: 2019-09-04

## 2019-09-04 DIAGNOSIS — K21.9 GASTROESOPHAGEAL REFLUX DISEASE WITHOUT ESOPHAGITIS: ICD-10-CM

## 2019-09-04 NOTE — TELEPHONE ENCOUNTER
PC from patients daughter, states Jean-Pierre Huerta just had a recent visit & Protonix was supposed to be called to [facility] in [facility]  They never received it, please call  to pharm

## 2019-09-05 RX ORDER — PANTOPRAZOLE SODIUM 40 MG/1
40 TABLET, DELAYED RELEASE ORAL DAILY
Qty: 90 TABLET | Refills: 0 | Status: SHIPPED | OUTPATIENT
Start: 2019-09-05

## 2019-09-28 ENCOUNTER — TRANSCRIBE ORDERS (OUTPATIENT)
Dept: ADMINISTRATIVE | Facility: HOSPITAL | Age: 64
End: 2019-09-28

## 2019-09-28 ENCOUNTER — APPOINTMENT (OUTPATIENT)
Dept: LAB | Facility: IMAGING CENTER | Age: 64
End: 2019-09-28
Payer: COMMERCIAL

## 2019-09-28 DIAGNOSIS — Z12.5 PROSTATE CANCER SCREENING: ICD-10-CM

## 2019-09-28 DIAGNOSIS — I10 ESSENTIAL HYPERTENSION: ICD-10-CM

## 2019-09-28 DIAGNOSIS — M1A.00X0 IDIOPATHIC CHRONIC GOUT WITHOUT TOPHUS, UNSPECIFIED SITE: ICD-10-CM

## 2019-09-28 DIAGNOSIS — E78.49 OTHER HYPERLIPIDEMIA: ICD-10-CM

## 2019-09-28 LAB
25(OH)D3 SERPL-MCNC: 27.9 NG/ML (ref 30–100)
ALBUMIN SERPL BCP-MCNC: 3.5 G/DL (ref 3.5–5)
ALP SERPL-CCNC: 58 U/L (ref 46–116)
ALT SERPL W P-5'-P-CCNC: 31 U/L (ref 12–78)
ANION GAP SERPL CALCULATED.3IONS-SCNC: 6 MMOL/L (ref 4–13)
AST SERPL W P-5'-P-CCNC: 22 U/L (ref 5–45)
BASOPHILS # BLD AUTO: 0.05 THOUSANDS/ΜL (ref 0–0.1)
BASOPHILS NFR BLD AUTO: 1 % (ref 0–1)
BILIRUB SERPL-MCNC: 0.56 MG/DL (ref 0.2–1)
BUN SERPL-MCNC: 22 MG/DL (ref 5–25)
CALCIUM SERPL-MCNC: 9.5 MG/DL (ref 8.3–10.1)
CHLORIDE SERPL-SCNC: 113 MMOL/L (ref 100–108)
CHOLEST SERPL-MCNC: 154 MG/DL (ref 50–200)
CO2 SERPL-SCNC: 23 MMOL/L (ref 21–32)
CREAT SERPL-MCNC: 1.14 MG/DL (ref 0.6–1.3)
EOSINOPHIL # BLD AUTO: 0.21 THOUSAND/ΜL (ref 0–0.61)
EOSINOPHIL NFR BLD AUTO: 2 % (ref 0–6)
ERYTHROCYTE [DISTWIDTH] IN BLOOD BY AUTOMATED COUNT: 13.1 % (ref 11.6–15.1)
GFR SERPL CREATININE-BSD FRML MDRD: 68 ML/MIN/1.73SQ M
GLUCOSE P FAST SERPL-MCNC: 113 MG/DL (ref 65–99)
HCT VFR BLD AUTO: 36.1 % (ref 36.5–49.3)
HCV AB SER QL: NORMAL
HDLC SERPL-MCNC: 71 MG/DL (ref 40–60)
HGB BLD-MCNC: 11.5 G/DL (ref 12–17)
IMM GRANULOCYTES # BLD AUTO: 0.02 THOUSAND/UL (ref 0–0.2)
IMM GRANULOCYTES NFR BLD AUTO: 0 % (ref 0–2)
LDLC SERPL CALC-MCNC: 63 MG/DL (ref 0–100)
LYMPHOCYTES # BLD AUTO: 0.64 THOUSANDS/ΜL (ref 0.6–4.47)
LYMPHOCYTES NFR BLD AUTO: 7 % (ref 14–44)
MCH RBC QN AUTO: 32.5 PG (ref 26.8–34.3)
MCHC RBC AUTO-ENTMCNC: 31.9 G/DL (ref 31.4–37.4)
MCV RBC AUTO: 102 FL (ref 82–98)
MONOCYTES # BLD AUTO: 0.6 THOUSAND/ΜL (ref 0.17–1.22)
MONOCYTES NFR BLD AUTO: 7 % (ref 4–12)
NEUTROPHILS # BLD AUTO: 7.74 THOUSANDS/ΜL (ref 1.85–7.62)
NEUTS SEG NFR BLD AUTO: 83 % (ref 43–75)
NRBC BLD AUTO-RTO: 0 /100 WBCS
PLATELET # BLD AUTO: 198 THOUSANDS/UL (ref 149–390)
PMV BLD AUTO: 9.6 FL (ref 8.9–12.7)
POTASSIUM SERPL-SCNC: 4 MMOL/L (ref 3.5–5.3)
PROT SERPL-MCNC: 6.6 G/DL (ref 6.4–8.2)
PSA SERPL-MCNC: 0.5 NG/ML (ref 0–4)
RBC # BLD AUTO: 3.54 MILLION/UL (ref 3.88–5.62)
SODIUM SERPL-SCNC: 142 MMOL/L (ref 136–145)
TRIGL SERPL-MCNC: 101 MG/DL
TSH SERPL DL<=0.05 MIU/L-ACNC: 0.58 UIU/ML (ref 0.36–3.74)
URATE SERPL-MCNC: 8.6 MG/DL (ref 4.2–8)
WBC # BLD AUTO: 9.26 THOUSAND/UL (ref 4.31–10.16)

## 2019-09-28 PROCEDURE — G0103 PSA SCREENING: HCPCS

## 2019-09-28 PROCEDURE — 84443 ASSAY THYROID STIM HORMONE: CPT

## 2019-09-28 PROCEDURE — 82306 VITAMIN D 25 HYDROXY: CPT

## 2019-09-28 PROCEDURE — 84550 ASSAY OF BLOOD/URIC ACID: CPT

## 2019-09-28 PROCEDURE — 80053 COMPREHEN METABOLIC PANEL: CPT

## 2019-09-28 PROCEDURE — 85025 COMPLETE CBC W/AUTO DIFF WBC: CPT

## 2019-09-28 PROCEDURE — 86803 HEPATITIS C AB TEST: CPT

## 2019-09-28 PROCEDURE — 80061 LIPID PANEL: CPT

## 2019-09-28 PROCEDURE — 36415 COLL VENOUS BLD VENIPUNCTURE: CPT

## 2019-10-23 ENCOUNTER — TELEPHONE (OUTPATIENT)
Dept: FAMILY MEDICINE CLINIC | Facility: CLINIC | Age: 64
End: 2019-10-23

## 2019-10-23 NOTE — LETTER
October 30, 2019     Ca Guerrero      Dear Mr Rankin Na:    Below are the results from your recent visit:    Resulted Orders   CBC and differential   Result Value Ref Range    WBC 9 26 4 31 - 10 16 Thousand/uL    RBC 3 54 (L) 3 88 - 5 62 Million/uL    Hemoglobin 11 5 (L) 12 0 - 17 0 g/dL    Hematocrit 36 1 (L) 36 5 - 49 3 %     (H) 82 - 98 fL    MCH 32 5 26 8 - 34 3 pg    MCHC 31 9 31 4 - 37 4 g/dL    RDW 13 1 11 6 - 15 1 %    MPV 9 6 8 9 - 12 7 fL    Platelets 713 666 - 228 Thousands/uL    nRBC 0 /100 WBCs    Neutrophils Relative 83 (H) 43 - 75 %    Immat GRANS % 0 0 - 2 %    Lymphocytes Relative 7 (L) 14 - 44 %    Monocytes Relative 7 4 - 12 %    Eosinophils Relative 2 0 - 6 %    Basophils Relative 1 0 - 1 %    Neutrophils Absolute 7 74 (H) 1 85 - 7 62 Thousands/µL    Immature Grans Absolute 0 02 0 00 - 0 20 Thousand/uL    Lymphocytes Absolute 0 64 0 60 - 4 47 Thousands/µL    Monocytes Absolute 0 60 0 17 - 1 22 Thousand/µL    Eosinophils Absolute 0 21 0 00 - 0 61 Thousand/µL    Basophils Absolute 0 05 0 00 - 0 10 Thousands/µL   Comprehensive metabolic panel   Result Value Ref Range    Sodium 142 136 - 145 mmol/L    Potassium 4 0 3 5 - 5 3 mmol/L    Chloride 113 (H) 100 - 108 mmol/L    CO2 23 21 - 32 mmol/L    ANION GAP 6 4 - 13 mmol/L    BUN 22 5 - 25 mg/dL    Creatinine 1 14 0 60 - 1 30 mg/dL      Comment:      Standardized to IDMS reference method    Glucose, Fasting 113 (H) 65 - 99 mg/dL      Comment:        Specimen collection should occur prior to Sulfasalazine administration due to the potential for falsely depressed results  Specimen collection should occur prior to Sulfapyridine administration due to the potential for falsely elevated results  Calcium 9 5 8 3 - 10 1 mg/dL    AST 22 5 - 45 U/L      Comment:        Specimen collection should occur prior to Sulfasalazine administration due to the potential for falsely depressed results       ALT 31 12 - 78 U/L      Comment:        Specimen collection should occur prior to Sulfasalazine and/or Sulfapyridine administration due to the potential for falsely depressed results  Alkaline Phosphatase 58 46 - 116 U/L    Total Protein 6 6 6 4 - 8 2 g/dL    Albumin 3 5 3 5 - 5 0 g/dL    Total Bilirubin 0 56 0 20 - 1 00 mg/dL    eGFR 68 ml/min/1 73sq m    Narrative    National Kidney Disease Foundation guidelines for Chronic Kidney Disease (CKD):     Stage 1 with normal or high GFR (GFR > 90 mL/min/1 73 square meters)    Stage 2 Mild CKD (GFR = 60-89 mL/min/1 73 square meters)    Stage 3A Moderate CKD (GFR = 45-59 mL/min/1 73 square meters)    Stage 3B Moderate CKD (GFR = 30-44 mL/min/1 73 square meters)    Stage 4 Severe CKD (GFR = 15-29 mL/min/1 73 square meters)    Stage 5 End Stage CKD (GFR <15 mL/min/1 73 square meters)  Note: GFR calculation is accurate only with a steady state creatinine   Lipid Panel with Direct LDL reflex   Result Value Ref Range    Cholesterol 154 50 - 200 mg/dL      Comment:        Cholesterol:       Desirable         <200 mg/dl       Borderline         200-239 mg/dl       High              >239           Triglycerides 101 <=150 mg/dL      Comment:        Triglyceride:     Normal          <150 mg/dl     Borderline High 150-199 mg/dl     High            200-499 mg/dl        Very High       >499 mg/dl    Specimen collection should occur prior to N-Acetylcysteine or Metamizole administration due to the potential for falsely depressed results  HDL, Direct 71 (H) 40 - 60 mg/dL      Comment:        HDL Cholesterol:       High    >60 mg/dL       Low     <41 mg/dL  Specimen collection should occur prior to Metamizole administration due to the potential for falsley depressed results  LDL Calculated 63 0 - 100 mg/dL      Comment: This screening LDL is a calculated result     It does not have the accuracy of the Direct Measured LDL in the monitoring of patients with hyperlipidemia and/or statin therapy  Direct Measure LDL (AME640) must be ordered separately in these patients  LDL Cholesterol:     Optimal           <100 mg/dl     Near Optimal      100-129 mg/dl     Above Optimal       Borderline High 130-159 mg/dl       High            160-189 mg/dl       Very High       >189 mg/dl          Vitamin D 25 hydroxy   Result Value Ref Range    Vit D, 25-Hydroxy 27 9 (L) 30 0 - 100 0 ng/mL    Narrative    This assay is a certified procedure of the CDC Vitamin D Standardization Certification Program (VDSCP)     Deficiency <20ng/ml   Insufficiency 20-30ng/ml   Sufficient  ng/ml     *Patients undergoing fluorescein dye angiography may retain small amounts of fluorescein in the body for 48-72 hours post procedure  Samples containing fluorescein can produce falsely elevated Vitamin D values  If the patient had this procedure, a specimen should be resubmitted post fluorescein clearance  PSA, Total Screen   Result Value Ref Range    PSA 0 5 0 0 - 4 0 ng/mL      Comment:        American Urological Association Guidelines define biochemical recurrence of prostate cancer as a detectable or rising PSA value post-radical prostatectomy that is greater than or equal to 0 2 ng/mL with a second confirmatory level of greater than or equal to 0 2 ng/mL  TSH, 3rd generation with Free T4 reflex   Result Value Ref Range    TSH 3RD GENERATON 0 584 0 358 - 3 740 uIU/mL      Comment:        Using supplements with high doses of biotin 20 to more than 300 times greater than the adequate daily intake for adults of 30 mcg/day as established by the [place] [employer], can cause falsely depress results  Narrative    Patients undergoing fluorescein dye angiography may retain small amounts of fluorescein in the body for 48-72 hours post procedure  Samples containing fluorescein can produce falsely depressed TSH values  If the patient had this procedure,a specimen should be resubmitted post fluorescein clearance  Hepatitis C antibody   Result Value Ref Range    Hepatitis C Ab Non-reactive Non-reactive     We have been trying to contact you by phone, and left several messages to call our office   Please give us a call at above number      Sincerely,      Jake Young MD

## 2019-10-23 NOTE — TELEPHONE ENCOUNTER
----- Message from Kamlesh Rivera MD sent at 10/6/2019  7:42 AM EDT -----  BW showed high fasting glucose  I will check A1C in his next office visit  His H&H came back low but improved from before  We will continue to monitor  Vit D is low  Uric acid is high  I recommend Allopurinol 100 mg twice a day  I will discuss further in his next appointment

## 2019-11-06 ENCOUNTER — TELEPHONE (OUTPATIENT)
Dept: FAMILY MEDICINE CLINIC | Facility: CLINIC | Age: 64
End: 2019-11-06

## 2019-11-06 DIAGNOSIS — E79.0 ELEVATED BLOOD URIC ACID LEVEL: Primary | ICD-10-CM

## 2019-11-07 NOTE — TELEPHONE ENCOUNTER
I discussed lab results with daughter Krystyna Covarrubias (on [facility])  Pt will keep appt next Friday and requesting Allopurinol 100mg  BID (as per Dr Bev Lau suggestion) be sent to [facility]  Request sent to provider

## 2019-11-08 RX ORDER — ALLOPURINOL 100 MG/1
200 TABLET ORAL DAILY
Qty: 60 TABLET | Refills: 0 | Status: SHIPPED | OUTPATIENT
Start: 2019-11-08 | End: 2020-01-06

## 2019-11-13 RX ORDER — TRAZODONE HYDROCHLORIDE 150 MG/1
TABLET ORAL
Refills: 1 | COMMUNITY
Start: 2019-10-10

## 2019-11-13 RX ORDER — ARIPIPRAZOLE 2 MG/1
2 TABLET ORAL EVERY MORNING
Refills: 1 | COMMUNITY
Start: 2019-10-10

## 2019-11-15 ENCOUNTER — OFFICE VISIT (OUTPATIENT)
Dept: FAMILY MEDICINE CLINIC | Facility: CLINIC | Age: 64
End: 2019-11-15
Payer: COMMERCIAL

## 2019-11-15 VITALS
WEIGHT: 206 LBS | SYSTOLIC BLOOD PRESSURE: 136 MMHG | BODY MASS INDEX: 31.22 KG/M2 | HEART RATE: 80 BPM | TEMPERATURE: 99.5 F | HEIGHT: 68 IN | DIASTOLIC BLOOD PRESSURE: 80 MMHG | RESPIRATION RATE: 16 BRPM | OXYGEN SATURATION: 96 %

## 2019-11-15 DIAGNOSIS — E78.2 MIXED HYPERLIPIDEMIA: Primary | ICD-10-CM

## 2019-11-15 DIAGNOSIS — M1A.00X0 IDIOPATHIC CHRONIC GOUT WITHOUT TOPHUS, UNSPECIFIED SITE: ICD-10-CM

## 2019-11-15 DIAGNOSIS — I25.5 ISCHEMIC CARDIOMYOPATHY: ICD-10-CM

## 2019-11-15 DIAGNOSIS — I10 ESSENTIAL HYPERTENSION: ICD-10-CM

## 2019-11-15 DIAGNOSIS — K21.9 GASTROESOPHAGEAL REFLUX DISEASE WITHOUT ESOPHAGITIS: ICD-10-CM

## 2019-11-15 DIAGNOSIS — Z12.11 SCREENING FOR COLON CANCER: ICD-10-CM

## 2019-11-15 PROCEDURE — 99214 OFFICE O/P EST MOD 30 MIN: CPT | Performed by: FAMILY MEDICINE

## 2019-11-15 PROCEDURE — 1036F TOBACCO NON-USER: CPT | Performed by: FAMILY MEDICINE

## 2019-11-15 NOTE — PROGRESS NOTES
Assessment/Plan:    Gastroesophageal reflux disease without esophagitis  Stable  Continue same  Will continue to monitor  Ischemic cardiomyopathy  Stable  Asymptomatic  Continue same  Continue follow up with cardiologist     HTN (hypertension)  Well controlled  Continue same  Will continue to monitor  Hyperlipidemia  Continue same  It was discussed about low-fat diet  Will continue to monitor  Diagnoses and all orders for this visit:    Mixed hyperlipidemia  -     Lipid Panel with Direct LDL reflex; Future    Gastroesophageal reflux disease without esophagitis    Essential hypertension  -     CBC and differential; Future  -     Comprehensive metabolic panel; Future  -     TSH, 3rd generation with Free T4 reflex; Future    Screening for colon cancer  -     Ambulatory referral to Colorectal Surgery; Future    Idiopathic chronic gout without tophus, unspecified site  -     Uric acid; Future    Ischemic cardiomyopathy    Other orders  -     traZODone (DESYREL) 150 mg tablet; TAKE 1 2 (ONE HALF) TABLET BY MOUTH ONCE DAILY AT BEDTIME  -     ARIPiprazole (ABILIFY) 2 mg tablet; Take 2 mg by mouth every morning          Subjective:      Patient ID: Blayne Burgess is a 59 y o  male  59 y o  Male with hx of HTN, HLD, GERD, and gout seen today for follow-up of his chronic conditions  HTN remains well-controlled on current medication and denies headaches, chest pain, palpitations, changes in urination or hematuria  HLD being treated with Lipitor  Lipid panel on 9/28 was significant only for elevated HDL  No acute gout flares since the previous visit  Lab work on 9/28 showed a elevated uric acid level of 8 6 but patient denies any symptoms  GERD remains controlled on pantoprozole 40mg  He did a trial of a period without medicine but symptoms started to recur  He restarted his pantoprozole and reports no symptoms since  He denies nausea, vomiting, changes in bowel habits      He reports diet is improving and maintains adequate hydration  Current non-smoker, quit 4 years ago  Prior 25 year smoking history  Denies alcohol or drug use  Due for colonoscopy  He has hemorrhoids and states he wants to take care of them first before undergoing procedure  The following portions of the patient's history were reviewed and updated as appropriate: allergies, current medications, past family history, past medical history, past social history, past surgical history and problem list       Review of Systems   Constitutional: Negative for activity change, chills, fatigue and fever  HENT: Negative for congestion, ear discharge, ear pain, rhinorrhea, sinus pressure, sinus pain and sore throat  Eyes: Negative for photophobia and visual disturbance  Respiratory: Negative for cough, chest tightness, shortness of breath and wheezing  Cardiovascular: Negative for chest pain and palpitations  Gastrointestinal: Negative for abdominal distention, abdominal pain, blood in stool, diarrhea, nausea and vomiting  Endocrine: Negative  Genitourinary: Negative for decreased urine volume, difficulty urinating, frequency, hematuria and urgency  Musculoskeletal: Positive for arthralgias (Hips and knees  Especially when at work) and back pain  Negative for myalgias  Skin: Negative for color change and rash  Allergic/Immunologic: Negative  Neurological: Negative for numbness and headaches  Hematological: Negative  Psychiatric/Behavioral: Negative for sleep disturbance  The patient is not nervous/anxious  Objective:      /80 (BP Location: Left arm, Patient Position: Sitting, Cuff Size: Large)   Pulse 80   Temp 99 5 °F (37 5 °C) (Tympanic)   Resp 16   Ht 5' 8" (1 727 m)   Wt 93 4 kg (206 lb)   SpO2 96%   BMI 31 32 kg/m²          Physical Exam   Constitutional: He is oriented to person, place, and time  He appears well-developed and well-nourished  HENT:   Head: Normocephalic and atraumatic     Eyes: Pupils are equal, round, and reactive to light  EOM are normal    Neck: Normal range of motion  Neck supple  Cardiovascular: Normal rate, regular rhythm and normal heart sounds  Pulmonary/Chest: Effort normal and breath sounds normal    Abdominal: Soft  Bowel sounds are normal    Musculoskeletal: Normal range of motion  He exhibits no edema  Lymphadenopathy:     He has no cervical adenopathy  Neurological: He is alert and oriented to person, place, and time  No cranial nerve deficit  Skin: Skin is warm  No rash noted  Psychiatric: He has a normal mood and affect  Nursing note and vitals reviewed

## 2019-11-15 NOTE — ASSESSMENT & PLAN NOTE
Continue same  It was discussed about low-fat diet  Will continue to monitor 
Stable  Asymptomatic  Continue same    Continue follow up with cardiologist 
Stable  Continue same  Will continue to monitor 
Well controlled  Continue same  Will continue to monitor 
unknown

## 2020-01-06 DIAGNOSIS — E79.0 ELEVATED BLOOD URIC ACID LEVEL: ICD-10-CM

## 2020-01-06 RX ORDER — ALLOPURINOL 100 MG/1
TABLET ORAL
Qty: 60 TABLET | Refills: 0 | Status: SHIPPED | OUTPATIENT
Start: 2020-01-06

## 2020-03-07 ENCOUNTER — APPOINTMENT (OUTPATIENT)
Dept: LAB | Facility: IMAGING CENTER | Age: 65
End: 2020-03-07

## 2020-03-07 ENCOUNTER — TRANSCRIBE ORDERS (OUTPATIENT)
Dept: ADMINISTRATIVE | Facility: HOSPITAL | Age: 65
End: 2020-03-07

## 2020-03-07 DIAGNOSIS — I10 ESSENTIAL HYPERTENSION, MALIGNANT: Primary | ICD-10-CM

## 2020-03-07 DIAGNOSIS — I10 ESSENTIAL HYPERTENSION, MALIGNANT: ICD-10-CM

## 2020-03-07 LAB
ANION GAP SERPL CALCULATED.3IONS-SCNC: 5 MMOL/L (ref 4–13)
BUN SERPL-MCNC: 28 MG/DL (ref 5–25)
CALCIUM SERPL-MCNC: 8.8 MG/DL (ref 8.3–10.1)
CHLORIDE SERPL-SCNC: 112 MMOL/L (ref 100–108)
CHOLEST SERPL-MCNC: 137 MG/DL (ref 50–200)
CO2 SERPL-SCNC: 23 MMOL/L (ref 21–32)
CREAT SERPL-MCNC: 1.33 MG/DL (ref 0.6–1.3)
ERYTHROCYTE [DISTWIDTH] IN BLOOD BY AUTOMATED COUNT: 13.4 % (ref 11.6–15.1)
GFR SERPL CREATININE-BSD FRML MDRD: 56 ML/MIN/1.73SQ M
GLUCOSE P FAST SERPL-MCNC: 102 MG/DL (ref 65–99)
HCT VFR BLD AUTO: 35.7 % (ref 36.5–49.3)
HDLC SERPL-MCNC: 70 MG/DL
HGB BLD-MCNC: 11.1 G/DL (ref 12–17)
LDLC SERPL CALC-MCNC: 48 MG/DL (ref 0–100)
MCH RBC QN AUTO: 32.2 PG (ref 26.8–34.3)
MCHC RBC AUTO-ENTMCNC: 31.1 G/DL (ref 31.4–37.4)
MCV RBC AUTO: 104 FL (ref 82–98)
NONHDLC SERPL-MCNC: 67 MG/DL
PLATELET # BLD AUTO: 147 THOUSANDS/UL (ref 149–390)
PMV BLD AUTO: 9.9 FL (ref 8.9–12.7)
POTASSIUM SERPL-SCNC: 3.9 MMOL/L (ref 3.5–5.3)
RBC # BLD AUTO: 3.45 MILLION/UL (ref 3.88–5.62)
SODIUM SERPL-SCNC: 140 MMOL/L (ref 136–145)
TRIGL SERPL-MCNC: 96 MG/DL
WBC # BLD AUTO: 5.47 THOUSAND/UL (ref 4.31–10.16)

## 2020-03-07 PROCEDURE — 80061 LIPID PANEL: CPT

## 2020-03-07 PROCEDURE — 85027 COMPLETE CBC AUTOMATED: CPT

## 2020-03-07 PROCEDURE — 36415 COLL VENOUS BLD VENIPUNCTURE: CPT

## 2020-03-07 PROCEDURE — 80048 BASIC METABOLIC PNL TOTAL CA: CPT

## 2020-08-21 ENCOUNTER — TELEPHONE (OUTPATIENT)
Dept: FAMILY MEDICINE CLINIC | Facility: CLINIC | Age: 65
End: 2020-08-21

## 2020-08-21 NOTE — TELEPHONE ENCOUNTER
----- Message from [employer] [facility] sent at 8/11/2020  6:52 PM EDT -----  Regarding: schedule appt  Please call pt and schedule 6 month follow up and PE per Dr Bermudez Dural

## 2021-02-08 ENCOUNTER — TELEPHONE (OUTPATIENT)
Dept: FAMILY MEDICINE CLINIC | Facility: CLINIC | Age: 66
End: 2021-02-08

## 2021-06-30 ENCOUNTER — TELEPHONE (OUTPATIENT)
Dept: FAMILY MEDICINE CLINIC | Facility: CLINIC | Age: 66
End: 2021-06-30

## 2021-10-04 ENCOUNTER — TELEPHONE (OUTPATIENT)
Dept: FAMILY MEDICINE CLINIC | Facility: CLINIC | Age: 66
End: 2021-10-04

## 2022-03-07 ENCOUNTER — TELEPHONE (OUTPATIENT)
Dept: FAMILY MEDICINE CLINIC | Facility: CLINIC | Age: 67
End: 2022-03-07

## 2023-01-16 ENCOUNTER — APPOINTMENT (OUTPATIENT)
Dept: LAB | Facility: MEDICAL CENTER | Age: 68
End: 2023-01-16

## 2023-01-16 DIAGNOSIS — I10 ESSENTIAL HYPERTENSION, BENIGN: ICD-10-CM

## 2023-01-16 DIAGNOSIS — I25.5 ISCHEMIC CARDIOMYOPATHY: ICD-10-CM

## 2023-01-16 DIAGNOSIS — I25.10 CORONARY ARTERY DISEASE INVOLVING NATIVE CORONARY ARTERY OF NATIVE HEART WITHOUT ANGINA PECTORIS: ICD-10-CM

## 2023-01-16 DIAGNOSIS — I71.40 ABDOMINAL AORTIC ANEURYSM (AAA) WITHOUT RUPTURE, UNSPECIFIED PART: ICD-10-CM

## 2023-01-16 LAB
ALBUMIN SERPL BCP-MCNC: 3.7 G/DL (ref 3.5–5)
ALP SERPL-CCNC: 62 U/L (ref 46–116)
ALT SERPL W P-5'-P-CCNC: 26 U/L (ref 12–78)
ANION GAP SERPL CALCULATED.3IONS-SCNC: 6 MMOL/L (ref 4–13)
AST SERPL W P-5'-P-CCNC: 16 U/L (ref 5–45)
BILIRUB SERPL-MCNC: 0.44 MG/DL (ref 0.2–1)
BUN SERPL-MCNC: 55 MG/DL (ref 5–25)
CALCIUM SERPL-MCNC: 9.4 MG/DL (ref 8.3–10.1)
CHLORIDE SERPL-SCNC: 107 MMOL/L (ref 96–108)
CHOLEST SERPL-MCNC: 159 MG/DL
CO2 SERPL-SCNC: 26 MMOL/L (ref 21–32)
CREAT SERPL-MCNC: 2.15 MG/DL (ref 0.6–1.3)
ERYTHROCYTE [DISTWIDTH] IN BLOOD BY AUTOMATED COUNT: 13.5 % (ref 11.6–15.1)
GFR SERPL CREATININE-BSD FRML MDRD: 30 ML/MIN/1.73SQ M
GLUCOSE P FAST SERPL-MCNC: 107 MG/DL (ref 65–99)
HCT VFR BLD AUTO: 33 % (ref 36.5–49.3)
HDLC SERPL-MCNC: 59 MG/DL
HGB BLD-MCNC: 10.1 G/DL (ref 12–17)
LDLC SERPL CALC-MCNC: 84 MG/DL (ref 0–100)
MCH RBC QN AUTO: 32.5 PG (ref 26.8–34.3)
MCHC RBC AUTO-ENTMCNC: 30.6 G/DL (ref 31.4–37.4)
MCV RBC AUTO: 106 FL (ref 82–98)
NONHDLC SERPL-MCNC: 100 MG/DL
PLATELET # BLD AUTO: 176 THOUSANDS/UL (ref 149–390)
PMV BLD AUTO: 9.7 FL (ref 8.9–12.7)
POTASSIUM SERPL-SCNC: 5.8 MMOL/L (ref 3.5–5.3)
PROT SERPL-MCNC: 7 G/DL (ref 6.4–8.4)
RBC # BLD AUTO: 3.11 MILLION/UL (ref 3.88–5.62)
SODIUM SERPL-SCNC: 139 MMOL/L (ref 135–147)
TRIGL SERPL-MCNC: 79 MG/DL
WBC # BLD AUTO: 7.07 THOUSAND/UL (ref 4.31–10.16)